# Patient Record
Sex: MALE | Race: WHITE | NOT HISPANIC OR LATINO | Employment: FULL TIME | URBAN - METROPOLITAN AREA
[De-identification: names, ages, dates, MRNs, and addresses within clinical notes are randomized per-mention and may not be internally consistent; named-entity substitution may affect disease eponyms.]

---

## 2018-08-01 PROCEDURE — 88305 TISSUE EXAM BY PATHOLOGIST: CPT | Performed by: PATHOLOGY

## 2018-08-02 ENCOUNTER — LAB REQUISITION (OUTPATIENT)
Dept: LAB | Facility: HOSPITAL | Age: 53
End: 2018-08-02
Payer: COMMERCIAL

## 2018-08-02 DIAGNOSIS — D48.9 NEOPLASM OF UNCERTAIN BEHAVIOR: ICD-10-CM

## 2020-03-04 ENCOUNTER — HOSPITAL ENCOUNTER (OUTPATIENT)
Facility: HOSPITAL | Age: 55
Setting detail: OBSERVATION
Discharge: HOME | End: 2020-03-05
Attending: EMERGENCY MEDICINE | Admitting: FAMILY MEDICINE
Payer: COMMERCIAL

## 2020-03-04 ENCOUNTER — APPOINTMENT (EMERGENCY)
Dept: RADIOLOGY | Facility: HOSPITAL | Age: 55
End: 2020-03-04
Payer: COMMERCIAL

## 2020-03-04 DIAGNOSIS — R61 DIAPHORESIS: ICD-10-CM

## 2020-03-04 DIAGNOSIS — R55 NEAR SYNCOPE: ICD-10-CM

## 2020-03-04 DIAGNOSIS — R11.10 VOMITING: ICD-10-CM

## 2020-03-04 DIAGNOSIS — R19.7 DIARRHEA: ICD-10-CM

## 2020-03-04 DIAGNOSIS — R06.00 DYSPNEA: ICD-10-CM

## 2020-03-04 DIAGNOSIS — R07.9 CHEST PAIN: Primary | ICD-10-CM

## 2020-03-04 DIAGNOSIS — R11.0 NAUSEA: ICD-10-CM

## 2020-03-04 PROBLEM — R91.1 LUNG NODULE SEEN ON IMAGING STUDY: Status: ACTIVE | Noted: 2020-03-04

## 2020-03-04 PROBLEM — I10 ESSENTIAL HYPERTENSION: Status: ACTIVE | Noted: 2020-03-04

## 2020-03-04 LAB
ALBUMIN SERPL BCP-MCNC: 3.9 G/DL (ref 3.5–5)
ALP SERPL-CCNC: 63 U/L (ref 46–116)
ALT SERPL W P-5'-P-CCNC: 44 U/L (ref 12–78)
ANION GAP SERPL CALCULATED.3IONS-SCNC: 10 MMOL/L (ref 4–13)
AST SERPL W P-5'-P-CCNC: 34 U/L (ref 5–45)
BACTERIA UR QL AUTO: NORMAL /HPF
BASOPHILS # BLD AUTO: 0.04 THOUSANDS/ΜL (ref 0–0.1)
BASOPHILS NFR BLD AUTO: 1 % (ref 0–1)
BILIRUB SERPL-MCNC: 0.6 MG/DL (ref 0.2–1)
BILIRUB UR QL STRIP: NEGATIVE
BUN SERPL-MCNC: 12 MG/DL (ref 5–25)
CALCIUM SERPL-MCNC: 9.4 MG/DL (ref 8.3–10.1)
CHLORIDE SERPL-SCNC: 98 MMOL/L (ref 100–108)
CLARITY UR: CLEAR
CO2 SERPL-SCNC: 28 MMOL/L (ref 21–32)
COLOR UR: NORMAL
CREAT SERPL-MCNC: 0.63 MG/DL (ref 0.6–1.3)
EOSINOPHIL # BLD AUTO: 0.12 THOUSAND/ΜL (ref 0–0.61)
EOSINOPHIL NFR BLD AUTO: 2 % (ref 0–6)
ERYTHROCYTE [DISTWIDTH] IN BLOOD BY AUTOMATED COUNT: 12.9 % (ref 11.6–15.1)
GFR SERPL CREATININE-BSD FRML MDRD: 113 ML/MIN/1.73SQ M
GLUCOSE SERPL-MCNC: 93 MG/DL (ref 65–140)
GLUCOSE UR STRIP-MCNC: NEGATIVE MG/DL
HCT VFR BLD AUTO: 42.9 % (ref 36.5–49.3)
HCT VFR BLD AUTO: 45.5 % (ref 36.5–49.3)
HGB BLD-MCNC: 13.9 G/DL (ref 12–17)
HGB BLD-MCNC: 14.8 G/DL (ref 12–17)
HGB UR QL STRIP.AUTO: NEGATIVE
IMM GRANULOCYTES # BLD AUTO: 0.02 THOUSAND/UL (ref 0–0.2)
IMM GRANULOCYTES NFR BLD AUTO: 0 % (ref 0–2)
KETONES UR STRIP-MCNC: NEGATIVE MG/DL
LEUKOCYTE ESTERASE UR QL STRIP: NEGATIVE
LIPASE SERPL-CCNC: 64 U/L (ref 73–393)
LYMPHOCYTES # BLD AUTO: 1.8 THOUSANDS/ΜL (ref 0.6–4.47)
LYMPHOCYTES NFR BLD AUTO: 25 % (ref 14–44)
MCH RBC QN AUTO: 29 PG (ref 26.8–34.3)
MCHC RBC AUTO-ENTMCNC: 32.5 G/DL (ref 31.4–37.4)
MCV RBC AUTO: 89 FL (ref 82–98)
MONOCYTES # BLD AUTO: 0.72 THOUSAND/ΜL (ref 0.17–1.22)
MONOCYTES NFR BLD AUTO: 10 % (ref 4–12)
NEUTROPHILS # BLD AUTO: 4.56 THOUSANDS/ΜL (ref 1.85–7.62)
NEUTS SEG NFR BLD AUTO: 62 % (ref 43–75)
NITRITE UR QL STRIP: NEGATIVE
NON-SQ EPI CELLS URNS QL MICRO: NORMAL /HPF
NRBC BLD AUTO-RTO: 0 /100 WBCS
NT-PROBNP SERPL-MCNC: 28 PG/ML
PH UR STRIP.AUTO: 6 [PH]
PLATELET # BLD AUTO: 365 THOUSANDS/UL (ref 149–390)
PMV BLD AUTO: 9.7 FL (ref 8.9–12.7)
POTASSIUM SERPL-SCNC: 3.8 MMOL/L (ref 3.5–5.3)
PROT SERPL-MCNC: 8 G/DL (ref 6.4–8.2)
PROT UR STRIP-MCNC: NEGATIVE MG/DL
RBC # BLD AUTO: 5.11 MILLION/UL (ref 3.88–5.62)
RBC #/AREA URNS AUTO: NORMAL /HPF
SODIUM SERPL-SCNC: 136 MMOL/L (ref 136–145)
SP GR UR STRIP.AUTO: 1.01 (ref 1–1.03)
TROPONIN I SERPL-MCNC: <0.02 NG/ML
UROBILINOGEN UR QL STRIP.AUTO: 0.2 E.U./DL
WBC # BLD AUTO: 7.26 THOUSAND/UL (ref 4.31–10.16)
WBC #/AREA URNS AUTO: NORMAL /HPF

## 2020-03-04 PROCEDURE — 84484 ASSAY OF TROPONIN QUANT: CPT | Performed by: PHYSICIAN ASSISTANT

## 2020-03-04 PROCEDURE — 83690 ASSAY OF LIPASE: CPT | Performed by: PHYSICIAN ASSISTANT

## 2020-03-04 PROCEDURE — 85025 COMPLETE CBC W/AUTO DIFF WBC: CPT | Performed by: PHYSICIAN ASSISTANT

## 2020-03-04 PROCEDURE — 99285 EMERGENCY DEPT VISIT HI MDM: CPT

## 2020-03-04 PROCEDURE — 84484 ASSAY OF TROPONIN QUANT: CPT | Performed by: FAMILY MEDICINE

## 2020-03-04 PROCEDURE — 81001 URINALYSIS AUTO W/SCOPE: CPT | Performed by: FAMILY MEDICINE

## 2020-03-04 PROCEDURE — 99285 EMERGENCY DEPT VISIT HI MDM: CPT | Performed by: PHYSICIAN ASSISTANT

## 2020-03-04 PROCEDURE — 99220 PR INITIAL OBSERVATION CARE/DAY 70 MINUTES: CPT | Performed by: FAMILY MEDICINE

## 2020-03-04 PROCEDURE — 74177 CT ABD & PELVIS W/CONTRAST: CPT

## 2020-03-04 PROCEDURE — 96361 HYDRATE IV INFUSION ADD-ON: CPT

## 2020-03-04 PROCEDURE — 70450 CT HEAD/BRAIN W/O DYE: CPT

## 2020-03-04 PROCEDURE — 85018 HEMOGLOBIN: CPT | Performed by: FAMILY MEDICINE

## 2020-03-04 PROCEDURE — 93005 ELECTROCARDIOGRAM TRACING: CPT

## 2020-03-04 PROCEDURE — 85014 HEMATOCRIT: CPT | Performed by: FAMILY MEDICINE

## 2020-03-04 PROCEDURE — 36415 COLL VENOUS BLD VENIPUNCTURE: CPT | Performed by: PHYSICIAN ASSISTANT

## 2020-03-04 PROCEDURE — 80053 COMPREHEN METABOLIC PANEL: CPT | Performed by: PHYSICIAN ASSISTANT

## 2020-03-04 PROCEDURE — 96374 THER/PROPH/DIAG INJ IV PUSH: CPT

## 2020-03-04 PROCEDURE — 83880 ASSAY OF NATRIURETIC PEPTIDE: CPT | Performed by: PHYSICIAN ASSISTANT

## 2020-03-04 RX ORDER — LISINOPRIL 10 MG/1
10 TABLET ORAL DAILY
Status: DISCONTINUED | OUTPATIENT
Start: 2020-03-05 | End: 2020-03-05 | Stop reason: HOSPADM

## 2020-03-04 RX ORDER — ONDANSETRON 2 MG/ML
4 INJECTION INTRAMUSCULAR; INTRAVENOUS ONCE
Status: COMPLETED | OUTPATIENT
Start: 2020-03-04 | End: 2020-03-04

## 2020-03-04 RX ORDER — SODIUM CHLORIDE 9 MG/ML
75 INJECTION, SOLUTION INTRAVENOUS CONTINUOUS
Status: DISCONTINUED | OUTPATIENT
Start: 2020-03-04 | End: 2020-03-05

## 2020-03-04 RX ORDER — PANTOPRAZOLE SODIUM 40 MG/1
40 TABLET, DELAYED RELEASE ORAL
Status: DISCONTINUED | OUTPATIENT
Start: 2020-03-04 | End: 2020-03-05 | Stop reason: HOSPADM

## 2020-03-04 RX ORDER — ASPIRIN 81 MG/1
81 TABLET ORAL DAILY
Status: DISCONTINUED | OUTPATIENT
Start: 2020-03-04 | End: 2020-03-05 | Stop reason: HOSPADM

## 2020-03-04 RX ORDER — ONDANSETRON 2 MG/ML
4 INJECTION INTRAMUSCULAR; INTRAVENOUS EVERY 6 HOURS PRN
Status: DISCONTINUED | OUTPATIENT
Start: 2020-03-04 | End: 2020-03-05 | Stop reason: HOSPADM

## 2020-03-04 RX ORDER — LISINOPRIL 10 MG/1
10 TABLET ORAL DAILY
COMMUNITY

## 2020-03-04 RX ORDER — ACETAMINOPHEN 325 MG/1
650 TABLET ORAL EVERY 6 HOURS PRN
Status: DISCONTINUED | OUTPATIENT
Start: 2020-03-04 | End: 2020-03-05 | Stop reason: HOSPADM

## 2020-03-04 RX ADMIN — ONDANSETRON 4 MG: 2 INJECTION INTRAMUSCULAR; INTRAVENOUS at 23:28

## 2020-03-04 RX ADMIN — ASPIRIN 81 MG: 81 TABLET, COATED ORAL at 18:44

## 2020-03-04 RX ADMIN — PANTOPRAZOLE SODIUM 40 MG: 40 TABLET, DELAYED RELEASE ORAL at 18:44

## 2020-03-04 RX ADMIN — ONDANSETRON 4 MG: 2 INJECTION INTRAMUSCULAR; INTRAVENOUS at 14:39

## 2020-03-04 RX ADMIN — SODIUM CHLORIDE 1000 ML: 0.9 INJECTION, SOLUTION INTRAVENOUS at 14:36

## 2020-03-04 RX ADMIN — IOHEXOL 100 ML: 350 INJECTION, SOLUTION INTRAVENOUS at 15:52

## 2020-03-04 RX ADMIN — SODIUM CHLORIDE 75 ML/HR: 0.9 INJECTION, SOLUTION INTRAVENOUS at 18:49

## 2020-03-04 NOTE — ED PROVIDER NOTES
History  Chief Complaint   Patient presents with    Nausea     Pt reports drinking unpasteurized milk on Sunday which caused him to be nauseous and have severe diarrhea that would not let up; pt reports this went on until Tuesday, and he felt better today so he went to work  At work today pt was nauseous and had a headache, he called his PCP and he told him to come here      This is a 50-year-old male patient who 4 days drank unpasteurized milk for that 24 hours he had severe vomiting and diarrhea has started to improve this morning he went to work and then had several bouts of severe diarrhea that he states the last 1 was bloody  He also has some nausea  He had several bouts shortness of breath and diaphoresis with this he denies any chest pain  He feels generalized weakness  Also complains of being dizzy not room spinning but more off balance and was he stated he felt like he was going to pass out  Patient also complains of pain over his epigastric area that does not radiate and describes it more as a burning  It is reproducible  No fever no chills  He states since the nausea he has had a generalized headache without neuro deficit  No cough congestion sore throat no urgency frequency or dysuria  Nothing makes the symptoms better or worse  Has tried nothing over-the-counter  Differential diagnosis includes not limited to nausea and vomiting secondary to spoiled no, viral syndrome, atypical ACS, pancreatitis, gastritis, cholecystitis, dizziness secondary to dehydration, intercranial issue, atypical ACS, electrolyte abnormality  Posterior CVA unlikely          Prior to Admission Medications   Prescriptions Last Dose Informant Patient Reported? Taking?   lisinopril (ZESTRIL) 10 mg tablet 3/4/2020 at Unknown time  Yes Yes   Sig: Take 10 mg by mouth daily      Facility-Administered Medications: None       History reviewed  No pertinent past medical history      Past Surgical History:   Procedure Laterality Date    HERNIA REPAIR      REPLACEMENT TOTAL KNEE BILATERAL Bilateral        History reviewed  No pertinent family history  I have reviewed and agree with the history as documented  E-Cigarette/Vaping    E-Cigarette Use Never User      E-Cigarette/Vaping Substances    Nicotine No     THC No     CBD No     Flavoring No     Other No     Unknown No      Social History     Tobacco Use    Smoking status: Former Smoker     Last attempt to quit: 3/4/2012     Years since quittin 0   Substance Use Topics    Alcohol use: Yes     Frequency: 2-4 times a month     Drinks per session: 3 or 4    Drug use: Never       Review of Systems   Constitutional: Positive for diaphoresis  Negative for fatigue and fever  HENT: Negative for congestion, ear pain, nosebleeds and sore throat  Eyes: Negative for photophobia, pain, discharge and visual disturbance  Respiratory: Positive for shortness of breath  Negative for cough, choking, chest tightness and wheezing  Cardiovascular: Negative for chest pain and palpitations  Gastrointestinal: Positive for abdominal pain, blood in stool, diarrhea, nausea and vomiting  Negative for abdominal distention and rectal pain  Endocrine: Negative for polydipsia and polyphagia  Genitourinary: Negative for dysuria, flank pain and frequency  Musculoskeletal: Negative for back pain, gait problem and joint swelling  Skin: Negative for color change and rash  Neurological: Positive for dizziness and headaches  Negative for tremors, seizures, syncope, facial asymmetry, speech difficulty, weakness, light-headedness and numbness  Psychiatric/Behavioral: Negative for behavioral problems and confusion  The patient is not nervous/anxious  All other systems reviewed and are negative  Physical Exam  Physical Exam   Constitutional: He appears well-developed and well-nourished  HENT:   Head: Normocephalic and atraumatic     Right Ear: External ear normal  Left Ear: External ear normal    Nose: Nose normal    Mouth/Throat: Oropharynx is clear and moist    Eyes: Pupils are equal, round, and reactive to light  Conjunctivae are normal    Neck: Normal range of motion  Neck supple  Cardiovascular: Normal rate and regular rhythm  Pulmonary/Chest: Effort normal and breath sounds normal    Abdominal: Soft  Bowel sounds are normal        Genitourinary: Rectal exam shows guaiac negative stool  Neurological: He is alert  Skin: Skin is warm  Psychiatric: He has a normal mood and affect  His behavior is normal    Nursing note and vitals reviewed        Vital Signs  ED Triage Vitals   Temperature Pulse Respirations Blood Pressure SpO2   03/04/20 1821 03/04/20 1342 03/04/20 1342 03/04/20 1342 03/04/20 1342   98 4 °F (36 9 °C) 81 22 143/76 97 %      Temp Source Heart Rate Source Patient Position - Orthostatic VS BP Location FiO2 (%)   03/04/20 2118 03/04/20 1342 03/04/20 1342 03/04/20 1342 --   Oral Monitor Lying Left arm       Pain Score       03/04/20 1530       No Pain           Vitals:    03/04/20 2119 03/04/20 2121 03/04/20 2323 03/05/20 0319   BP: 127/76 142/91 133/85 130/88   Pulse: 84 89 82 77   Patient Position - Orthostatic VS: Sitting - Orthostatic VS Standing - Orthostatic VS Lying Lying         Visual Acuity      ED Medications  Medications   ondansetron (ZOFRAN) injection 4 mg (4 mg Intravenous Given 3/4/20 2328)   lisinopril (ZESTRIL) tablet 10 mg (has no administration in time range)   sodium chloride 0 9 % infusion (75 mL/hr Intravenous New Bag 3/5/20 0638)   acetaminophen (TYLENOL) tablet 650 mg (650 mg Oral Given 3/5/20 0631)   aspirin (ECOTRIN LOW STRENGTH) EC tablet 81 mg (81 mg Oral Given 3/4/20 1844)   pantoprazole (PROTONIX) EC tablet 40 mg (40 mg Oral Given 3/5/20 0629)   sodium chloride 0 9 % bolus 1,000 mL (0 mL Intravenous Stopped 3/4/20 1632)   ondansetron (ZOFRAN) injection 4 mg (4 mg Intravenous Given 3/4/20 1439)   iohexol (OMNIPAQUE) 350 MG/ML injection (MULTI-DOSE) 100 mL (100 mL Intravenous Given 3/4/20 0072)       Diagnostic Studies  Results Reviewed     Procedure Component Value Units Date/Time    Comprehensive metabolic panel [[id number]]  (Abnormal) Collected:  03/04/20 1433    Lab Status:  Final result Specimen:  Blood from Arm, Right Updated:  03/04/20 1515     Sodium 136 mmol/L      Potassium 3 8 mmol/L      Chloride 98 mmol/L      CO2 28 mmol/L      ANION GAP 10 mmol/L      BUN 12 mg/dL      Creatinine 0 63 mg/dL      Glucose 93 mg/dL      Calcium 9 4 mg/dL      AST 34 U/L      ALT 44 U/L      Alkaline Phosphatase 63 U/L      Total Protein 8 0 g/dL      Albumin 3 9 g/dL      Total Bilirubin 0 60 mg/dL      eGFR 113 ml/min/1 73sq m     Narrative:       [place] guidelines for Chronic Kidney Disease (CKD):     Stage 1 with normal or high GFR (GFR > 90 mL/min/1 73 square meters)    Stage 2 Mild CKD (GFR = 60-89 mL/min/1 73 square meters)    Stage 3A Moderate CKD (GFR = 45-59 mL/min/1 73 square meters)    Stage 3B Moderate CKD (GFR = 30-44 mL/min/1 73 square meters)    Stage 4 Severe CKD (GFR = 15-29 mL/min/1 73 square meters)    Stage 5 End Stage CKD (GFR <15 mL/min/1 73 square meters)  Note: GFR calculation is accurate only with a steady state creatinine    Lipase [[id number]]  (Abnormal) Collected:  03/04/20 1433    Lab Status:  Final result Specimen:  Blood from Arm, Right Updated:  03/04/20 1515     Lipase 64 u/L     NT-BNP PRO [[id number]]  (Normal) Collected:  03/04/20 1433    Lab Status:  Final result Specimen:  Blood from Arm, Right Updated:  03/04/20 1515     NT-proBNP 28 pg/mL     Troponin I [[id number]]  (Normal) Collected:  03/04/20 1433    Lab Status:  Final result Specimen:  Blood from Arm, Right Updated:  03/04/20 1513     Troponin I <0 02 ng/mL     CBC and differential [[id number]] Collected:  03/04/20 1433    Lab Status:  Final result Specimen:  Blood from Arm, Right Updated:  03/04/20 1443     WBC 7 26 Thousand/uL      RBC 5 11 Million/uL      Hemoglobin 14 8 g/dL      Hematocrit 45 5 %      MCV 89 fL      MCH 29 0 pg      MCHC 32 5 g/dL      RDW 12 9 %      MPV 9 7 fL      Platelets 408 Thousands/uL      nRBC 0 /100 WBCs      Neutrophils Relative 62 %      Immat GRANS % 0 %      Lymphocytes Relative 25 %      Monocytes Relative 10 %      Eosinophils Relative 2 %      Basophils Relative 1 %      Neutrophils Absolute 4 56 Thousands/µL      Immature Grans Absolute 0 02 Thousand/uL      Lymphocytes Absolute 1 80 Thousands/µL      Monocytes Absolute 0 72 Thousand/µL      Eosinophils Absolute 0 12 Thousand/µL      Basophils Absolute 0 04 Thousands/µL     Stool Enteric Bacterial Panel by PCR [[id number]]     Lab Status:  No result Specimen:  Stool from Rectum     Aeromonas/Pleisiomonas Stool Culture [[id number]]     Lab Status:  No result Specimen:  Stool from Per Rectum                  CT head without contrast   Final Result by Massimo Lambert MD (03/04 1642)      No acute intracranial abnormality  Workstation performed: [id number]         CT abdomen pelvis with contrast   Final Result by Nicole Hudson MD (03/04 1625)      1  No acute abnormality in the abdomen or pelvis  2   Diffuse hepatic steatosis  3   Diverticulosis coli  4   Lung nodules measuring up to 6 mm  Based on current Fleischner Society 2017 Guidelines on incidental pulmonary nodule, followup non-contrast CT is recommended at 6-12 months from the initial examination and, if stable at that time, an additional    followup is recommended for 18-24 months from the initial examination  The study was marked in EPIC for significant notification              Workstation performed: [id number]         XR chest portable    (Results Pending)              Procedures  Procedures         ED Course  ED Course as of Mar 05 0656   Wed Mar 04, 2020   4109 Initial EKG interpreted by me 85 beats per minute normal sinus rhythm no ST elevation 1 unifocal PVC normal axis  no previous            HEART Risk Score      Most Recent Value   Heart Score Risk Calculator   History  1 Filed at: 03/04/2020 1656   ECG  0 Filed at: 03/04/2020 1656   Age  1 Filed at: 03/04/2020 1656   Risk Factors  2 Filed at: 03/04/2020 1656   Troponin  0 Filed at: 03/04/2020 1656   HEART Score  4 Filed at: 03/04/2020 1656                            MDM      Disposition  Final diagnoses:   Nausea   Vomiting   Diarrhea   Diaphoresis   Near syncope     Time reflects when diagnosis was documented in both MDM as applicable and the Disposition within this note     Time User Action Codes Description Comment    3/4/2020  5:10 PM Samantha Oscar Add [R07 9] Chest pain     3/4/2020  5:11 PM [place], [place] Add [R11 0] Nausea     3/4/2020  5:11 PM Jos Sprinkles [R11 10] Vomiting     3/4/2020  5:12 PM Jos Sprinkles [R19 7] Diarrhea     3/4/2020  5:12 PM Tino Godfrey, [address]     3/4/2020  5:12 PM Dinapoli, [address] [place] Add [R55] Near syncope       ED Disposition     ED Disposition Condition Date/Time Comment    Admit Stable Wed Mar 4, 2020  5:11 PM Case was discussed with Dr Emilee Thompson  and the patient's admission status was agreed to be Admission Status: observation status to the service of Dr Emilee Thompson   Follow-up Information    None         Current Discharge Medication List      CONTINUE these medications which have NOT CHANGED    Details   lisinopril (ZESTRIL) 10 mg tablet Take 10 mg by mouth daily           No discharge procedures on file      PDMP Review     None          ED Provider  Electronically Signed by           Marifer Acevedo PA-C  03/04/20 [place] [facility]  Tino Godfrey PA-C  03/05/20 0709

## 2020-03-04 NOTE — LETTER
[address] [facility] [address]  [place]  [place] [address]  Dept: [phone]    March 5, 2020     Patient: Marielos Schilling   YOB: 1965   Date of Visit: 3/4/2020       To Whom it May Concern:    Shira Galicia is under my professional care  He was seen in the hospital from 3/4/2020   to 03/05/20  He may return to work on 3/9/20 without limitations  If you have any questions or concerns, please don't hesitate to call           Sincerely,          Oly Hirsch PA-C

## 2020-03-04 NOTE — H&P
History and Physical - [facility]    Patient Information: Buck Light 49 y o  male MRN: [id number]  Unit/Bed#: DELFINA Encounter: [id number]  Admitting Physician: Virginia Ch DO  PCP: Augustus Jerome DO  Date of Admission:  03/04/20        Hospital Problem List:     Principal Problem:    Dyspnea  Active Problems:    Near syncope    Lung nodule seen on imaging study    Diarrhea    Essential hypertension      Assessment/Plan:    * Dyspnea  Assessment & Plan  Patient reports dyspnea, diaphoresis  Patient is also extremely stressed out at work over the last week  Will obtain chest x-ray for further evaluation  Initial troponin negative  Trend troponins  Place patient on aspirin for now  Check lipid panel, TSH  Patient denies any recent cardiac workup  Keep patient NPO after midnight for any possible cardiac evaluation  Check overnight pulse ox    Near syncope  Assessment & Plan  Patient reports lightheadedness while at work today and felt like he was going to pass out  Although creatinine is normal this could be due to hypovolemia from multiple episodes of diarrhea that he was having at home  Obtain orthostatic vitals for further evaluation  CT head was negative for acute abnormality  Gentle hydration with IV fluids overnight  Patient received a L of normal saline bolus in the ER    Diarrhea  Assessment & Plan  Patient reports epigastric pain, diarrhea and nausea after drinking unpasteurized milk  Diarrhea has resolved but still with nausea  Likely viral gastroenteritis  CT scan showed diverticulosis and diffuse hepatic steatosis but no acute abnormality  Symptomatic treatment with IV fluids, IV Zofran p r n  Lipase within normal limits  Repeat lab work in a m  Patient reports 1 episode today where he noticed some bright red blood in the toilet  This could be hemorrhoidal bleeding  Hemoglobin is stable  Repeat hemoglobin later today and again in a m   Patient can follow up with GI after discharge for further management    Essential hypertension  Assessment & Plan  Patient states that his blood pressure was in the 170s when EMS checked it  Blood pressure improved in the ER  Continue lisinopril and continue to monitor blood pressures      Lung nodule seen on imaging study  Assessment & Plan  Patient need CT chest in 6-12 months for follow-up  Patient notified regarding these findings          VTE Prophylaxis: Enoxaparin (Lovenox)  / sequential compression device   Code Status: Level 1 - Full Code    Anticipated Length of Stay:  Patient will be admitted on an Observation basis with an anticipated length of stay of  1 midnights  Justification for Hospital Stay: dyspnea, diaphoresis, near syncope    Total Time for Visit, including Counseling / Coordination of Care: 45 minutes  Greater than 50% of this total time spent on direct patient counseling and coordination of care  Chief Complaint:     Nausea (Pt reports drinking unpasteurized milk on Sunday which caused him to be nauseous and have severe diarrhea that would not let up; pt reports this went on until Tuesday, and he felt better today so he went to work  At work today pt was nauseous and had a headache, he called his PCP and he told him to come here )    of Present Illness:    Katie Roque is a 49 y o  male who presents with complaints of lightheadedness, weakness earlier today  patient states that about 4 days ago he drank unpasteurized milk and about 24 hours later developed multiple episodes of diarrhea along with nausea but no vomiting  Patient states that over the last 2 days the diarrhea has resolved but he has had multiple formed BMs  With his last BM he noticed some bright red blood in the toilet  Patient also reports shortness of breath and diaphoresis over the last few days but no chest pain  Does report epigastric pain which feels more like a burning    Denies any change in appetite, fevers, chills, recent travels or sick contact  Reports some productive cough  Patient states that while at work today he felt extremely lightheaded and felt like he was going to pass out  He does report being stressed at work over the last week  Denies any recent cardiac workup    Review of Systems:    Review of Systems   Constitutional: Negative for appetite change and chills  HENT: Negative for congestion, sore throat and trouble swallowing  Eyes: Negative for photophobia  Respiratory: Positive for cough and shortness of breath  Negative for chest tightness  Cardiovascular: Positive for palpitations  Negative for chest pain and leg swelling  Gastrointestinal: Positive for abdominal pain, anal bleeding, diarrhea and nausea  Negative for vomiting  Genitourinary: Positive for frequency  Negative for dysuria and hematuria  Musculoskeletal: Positive for back pain (chronic)  Skin: Negative for wound  Neurological: Positive for weakness, light-headedness and headaches  Negative for syncope  Psychiatric/Behavioral: Negative for agitation  Past Medical and Surgical History:     History reviewed  No pertinent past medical history  Past Surgical History:   Procedure Laterality Date    HERNIA REPAIR      REPLACEMENT TOTAL KNEE BILATERAL Bilateral        Meds/Allergies:    PTA meds:   Prior to Admission Medications   Prescriptions Last Dose Informant Patient Reported? Taking?   lisinopril (ZESTRIL) 10 mg tablet 3/4/2020 at Unknown time  Yes Yes   Sig: Take 10 mg by mouth daily      Facility-Administered Medications: None       Allergies:    Allergies   Allergen Reactions    Lunesta [Eszopiclone] Delirium     History:     Marital Status: /Civil Union     Substance Use History:   Social History     Substance and Sexual Activity   Alcohol Use Yes    Frequency: 2-4 times a month    Drinks per session: 3 or 4     Social History     Tobacco Use   Smoking Status Former Smoker    Last attempt to quit: 3/4/2012    Years since quittin 0     Social History     Substance and Sexual Activity   Drug Use Never       Family History:    History reviewed  No pertinent family history  Physical Exam:     Vitals:   Blood Pressure: 144/86 (20 1632)  Pulse: 80 (20 1632)  Respirations: 17 (20 1632)  SpO2: 97 % (20 1632)    Physical Exam   Constitutional: He is oriented to person, place, and time  He appears well-developed and well-nourished  No distress  HENT:   Head: Normocephalic and atraumatic  Eyes: EOM are normal  Right eye exhibits no discharge  Left eye exhibits no discharge  No scleral icterus  Cardiovascular: Normal rate and regular rhythm  Pulmonary/Chest: Effort normal and breath sounds normal  No respiratory distress  He has no wheezes  He has no rales  Abdominal: Soft  Bowel sounds are normal  He exhibits no distension  There is no tenderness  Musculoskeletal: He exhibits no edema  Neurological: He is alert and oriented to person, place, and time  No cranial nerve deficit  Skin: He is not diaphoretic  Psychiatric: His mood appears anxious  Lab Results: I have personally reviewed pertinent reports  Results from last 7 days   Lab Units 20  1433   WBC Thousand/uL 7 26   HEMOGLOBIN g/dL 14 8   HEMATOCRIT % 45 5   PLATELETS Thousands/uL 365   NEUTROS PCT % 62   LYMPHS PCT % 25   MONOS PCT % 10   EOS PCT % 2     Results from last 7 days   Lab Units 20  1433   POTASSIUM mmol/L 3 8   CHLORIDE mmol/L 98*   CO2 mmol/L 28   BUN mg/dL 12   CREATININE mg/dL 0 63   CALCIUM mg/dL 9 4   ALK PHOS U/L 63   ALT U/L 44   AST U/L 34           Imaging: I have personally reviewed pertinent reports  Ct Head Without Contrast    Result Date: 3/4/2020  Narrative: CT BRAIN - WITHOUT CONTRAST INDICATION:   Dizzy  COMPARISON:  None  TECHNIQUE:  CT examination of the brain was performed    In addition to axial images, coronal 2D reformatted images were created and submitted for interpretation  Radiation dose length product (DLP) for this visit:  946 63 mGy-cm   This examination, like all CT scans performed in the [facility], was performed utilizing techniques to minimize radiation dose exposure, including the use of iterative  reconstruction and automated exposure control  IMAGE QUALITY:  Diagnostic  FINDINGS: PARENCHYMA:  No intracranial mass, mass effect or midline shift  No CT signs of acute infarction  No acute parenchymal hemorrhage  VENTRICLES AND EXTRA-AXIAL SPACES:  Normal for the patient's age  VISUALIZED ORBITS AND PARANASAL SINUSES:  Unremarkable  CALVARIUM AND EXTRACRANIAL SOFT TISSUES:  Normal      Impression: No acute intracranial abnormality  Workstation performed: [id number]     Ct Abdomen Pelvis With Contrast    Result Date: 3/4/2020  Narrative: CT ABDOMEN AND PELVIS WITH IV CONTRAST INDICATION:   Abdominal pain  Nausea  There is a 6 mm left lower lobe nodule on series 2, image 11  COMPARISON:  None  TECHNIQUE:  CT examination of the abdomen and pelvis was performed  Axial, sagittal, and coronal 2D reformatted images were created from the source data and submitted for interpretation  Radiation dose length product (DLP) for this visit:  1778 19 mGy-cm   This examination, like all CT scans performed in the [facility], was performed utilizing techniques to minimize radiation dose exposure, including the use of iterative reconstruction and automated exposure control  IV Contrast:  100 mL of iohexol (OMNIPAQUE) Enteric Contrast:  Enteric contrast was not administered  FINDINGS: ABDOMEN LOWER CHEST:  There is a 5 mm right lower lobe nodule on series 2, image 12  LIVER/BILIARY TREE:  Liver is diffusely decreased in density consistent with fatty change  No CT evidence of suspicious hepatic mass  Normal hepatic contours  No biliary dilatation  GALLBLADDER:  No calcified gallstones  No pericholecystic inflammatory change   SPLEEN: Unremarkable  PANCREAS:  Unremarkable  ADRENAL GLANDS:  Unremarkable  KIDNEYS/URETERS:  Unremarkable  No hydronephrosis  STOMACH AND BOWEL:  There is colonic diverticulosis without evidence of acute diverticulitis  APPENDIX:  No findings to suggest appendicitis  ABDOMINOPELVIC CAVITY:  No ascites or free intraperitoneal air  No lymphadenopathy  VESSELS:  Unremarkable for patient's age  PELVIS REPRODUCTIVE ORGANS:  Unremarkable for patient's age  URINARY BLADDER:  Unremarkable  ABDOMINAL WALL/INGUINAL REGIONS:  Unremarkable  OSSEOUS STRUCTURES:  No acute fracture or destructive osseous lesion  Impression: 1  No acute abnormality in the abdomen or pelvis  2   Diffuse hepatic steatosis  3   Diverticulosis coli  4   Lung nodules measuring up to 6 mm  Based on current Fleischner Society 2017 Guidelines on incidental pulmonary nodule, followup non-contrast CT is recommended at 6-12 months from the initial examination and, if stable at that time, an additional followup is recommended for 18-24 months from the initial examination  The study was marked in EPIC for significant notification  Workstation performed: [id number]       CT head without contrast   Final Result      No acute intracranial abnormality  Workstation performed: [id number]         CT abdomen pelvis with contrast   Final Result      1  No acute abnormality in the abdomen or pelvis  2   Diffuse hepatic steatosis  3   Diverticulosis coli  4   Lung nodules measuring up to 6 mm  Based on current Fleischner Society 2017 Guidelines on incidental pulmonary nodule, followup non-contrast CT is recommended at 6-12 months from the initial examination and, if stable at that time, an additional    followup is recommended for 18-24 months from the initial examination  The study was marked in EPIC for significant notification              Workstation performed: [id number]             EKG, Pathology, and Other Studies Reviewed on Admission:   · EKG shows sinus rhythm with no acute ST elevations    Allscripts/EPIC Records Reviewed: Yes     ** Please Note: "This note has been constructed using a voice recognition system  Therefore there may be syntax, spelling, and/or grammatical errors   Please call if you have any questions  "**

## 2020-03-04 NOTE — ASSESSMENT & PLAN NOTE
Patient reports dyspnea, diaphoresis  Patient is also extremely stressed out at work over the last week  Will obtain chest x-ray for further evaluation  Initial troponin negative  Trend troponins  Place patient on aspirin for now  Check lipid panel, TSH  Patient denies any recent cardiac workup    Keep patient NPO after midnight for any possible cardiac evaluation  Check overnight pulse ox

## 2020-03-04 NOTE — ASSESSMENT & PLAN NOTE
Patient reports epigastric pain, diarrhea and nausea after drinking unpasteurized milk  Diarrhea has resolved but still with nausea  Likely viral gastroenteritis  CT scan showed diverticulosis and diffuse hepatic steatosis but no acute abnormality  Symptomatic treatment with IV fluids, IV Zofran p r n  Lipase within normal limits  Repeat lab work in a m  Patient reports 1 episode today where he noticed some bright red blood in the toilet  This could be hemorrhoidal bleeding  Hemoglobin is stable  Repeat hemoglobin later today and again in a m   Patient can follow up with GI after discharge for further management

## 2020-03-04 NOTE — ASSESSMENT & PLAN NOTE
Patient reports lightheadedness while at work today and felt like he was going to pass out  Although creatinine is normal this could be due to hypovolemia from multiple episodes of diarrhea that he was having at home  Obtain orthostatic vitals for further evaluation  CT head was negative for acute abnormality  Gentle hydration with IV fluids overnight    Patient received a L of normal saline bolus in the ER

## 2020-03-04 NOTE — ED NOTES
Pt provided with sandwich for transport upstairs for dinner since he is staying        Coreen Bedoya RN  03/04/20 8215

## 2020-03-04 NOTE — ASSESSMENT & PLAN NOTE
Patient states that his blood pressure was in the 170s when EMS checked it  Blood pressure improved in the ER  Continue lisinopril and continue to monitor blood pressures

## 2020-03-05 ENCOUNTER — APPOINTMENT (OUTPATIENT)
Dept: NON INVASIVE DIAGNOSTICS | Facility: HOSPITAL | Age: 55
End: 2020-03-05
Payer: COMMERCIAL

## 2020-03-05 ENCOUNTER — APPOINTMENT (OUTPATIENT)
Dept: RADIOLOGY | Facility: HOSPITAL | Age: 55
End: 2020-03-05
Payer: COMMERCIAL

## 2020-03-05 VITALS
DIASTOLIC BLOOD PRESSURE: 83 MMHG | TEMPERATURE: 100.7 F | HEART RATE: 109 BPM | SYSTOLIC BLOOD PRESSURE: 127 MMHG | HEIGHT: 72 IN | BODY MASS INDEX: 42.66 KG/M2 | WEIGHT: 315 LBS | RESPIRATION RATE: 20 BRPM | OXYGEN SATURATION: 95 %

## 2020-03-05 LAB
ALBUMIN SERPL BCP-MCNC: 3.2 G/DL (ref 3.5–5)
ALP SERPL-CCNC: 52 U/L (ref 46–116)
ALT SERPL W P-5'-P-CCNC: 39 U/L (ref 12–78)
ANION GAP SERPL CALCULATED.3IONS-SCNC: 4 MMOL/L (ref 4–13)
AST SERPL W P-5'-P-CCNC: 31 U/L (ref 5–45)
ATRIAL RATE: 85 BPM
ATRIAL RATE: 87 BPM
BILIRUB SERPL-MCNC: 0.5 MG/DL (ref 0.2–1)
BUN SERPL-MCNC: 11 MG/DL (ref 5–25)
CALCIUM SERPL-MCNC: 8.6 MG/DL (ref 8.3–10.1)
CHLORIDE SERPL-SCNC: 103 MMOL/L (ref 100–108)
CHOLEST SERPL-MCNC: 96 MG/DL (ref 50–200)
CO2 SERPL-SCNC: 31 MMOL/L (ref 21–32)
CREAT SERPL-MCNC: 0.65 MG/DL (ref 0.6–1.3)
ERYTHROCYTE [DISTWIDTH] IN BLOOD BY AUTOMATED COUNT: 13 % (ref 11.6–15.1)
EST. AVERAGE GLUCOSE BLD GHB EST-MCNC: 117 MG/DL
GFR SERPL CREATININE-BSD FRML MDRD: 111 ML/MIN/1.73SQ M
GLUCOSE P FAST SERPL-MCNC: 111 MG/DL (ref 65–99)
GLUCOSE SERPL-MCNC: 111 MG/DL (ref 65–140)
HBA1C MFR BLD: 5.7 %
HCT VFR BLD AUTO: 43 % (ref 36.5–49.3)
HDLC SERPL-MCNC: 46 MG/DL
HGB BLD-MCNC: 13.7 G/DL (ref 12–17)
LDLC SERPL CALC-MCNC: 44 MG/DL (ref 0–100)
MAGNESIUM SERPL-MCNC: 2.1 MG/DL (ref 1.6–2.6)
MCH RBC QN AUTO: 29.1 PG (ref 26.8–34.3)
MCHC RBC AUTO-ENTMCNC: 31.9 G/DL (ref 31.4–37.4)
MCV RBC AUTO: 92 FL (ref 82–98)
NONHDLC SERPL-MCNC: 50 MG/DL
P AXIS: 36 DEGREES
P AXIS: 6 DEGREES
PLATELET # BLD AUTO: 314 THOUSANDS/UL (ref 149–390)
PMV BLD AUTO: 9.4 FL (ref 8.9–12.7)
POTASSIUM SERPL-SCNC: 4.3 MMOL/L (ref 3.5–5.3)
PR INTERVAL: 170 MS
PR INTERVAL: 174 MS
PROT SERPL-MCNC: 6.7 G/DL (ref 6.4–8.2)
QRS AXIS: 11 DEGREES
QRS AXIS: 14 DEGREES
QRSD INTERVAL: 94 MS
QRSD INTERVAL: 96 MS
QT INTERVAL: 352 MS
QT INTERVAL: 356 MS
QTC INTERVAL: 423 MS
QTC INTERVAL: 423 MS
RBC # BLD AUTO: 4.7 MILLION/UL (ref 3.88–5.62)
SODIUM SERPL-SCNC: 138 MMOL/L (ref 136–145)
T WAVE AXIS: 41 DEGREES
T WAVE AXIS: 57 DEGREES
TRIGL SERPL-MCNC: 32 MG/DL
TSH SERPL DL<=0.05 MIU/L-ACNC: 3.13 UIU/ML (ref 0.36–3.74)
VENTRICULAR RATE: 85 BPM
VENTRICULAR RATE: 87 BPM
WBC # BLD AUTO: 5.88 THOUSAND/UL (ref 4.31–10.16)

## 2020-03-05 PROCEDURE — 93010 ELECTROCARDIOGRAM REPORT: CPT | Performed by: INTERNAL MEDICINE

## 2020-03-05 PROCEDURE — 80061 LIPID PANEL: CPT | Performed by: FAMILY MEDICINE

## 2020-03-05 PROCEDURE — 93018 CV STRESS TEST I&R ONLY: CPT | Performed by: INTERNAL MEDICINE

## 2020-03-05 PROCEDURE — 93016 CV STRESS TEST SUPVJ ONLY: CPT | Performed by: INTERNAL MEDICINE

## 2020-03-05 PROCEDURE — 93005 ELECTROCARDIOGRAM TRACING: CPT

## 2020-03-05 PROCEDURE — 99244 OFF/OP CNSLTJ NEW/EST MOD 40: CPT | Performed by: INTERNAL MEDICINE

## 2020-03-05 PROCEDURE — 71045 X-RAY EXAM CHEST 1 VIEW: CPT

## 2020-03-05 PROCEDURE — 99217 PR OBSERVATION CARE DISCHARGE MANAGEMENT: CPT | Performed by: PHYSICIAN ASSISTANT

## 2020-03-05 PROCEDURE — 85027 COMPLETE CBC AUTOMATED: CPT | Performed by: FAMILY MEDICINE

## 2020-03-05 PROCEDURE — 84443 ASSAY THYROID STIM HORMONE: CPT | Performed by: FAMILY MEDICINE

## 2020-03-05 PROCEDURE — 83036 HEMOGLOBIN GLYCOSYLATED A1C: CPT | Performed by: PHYSICIAN ASSISTANT

## 2020-03-05 PROCEDURE — 80053 COMPREHEN METABOLIC PANEL: CPT | Performed by: FAMILY MEDICINE

## 2020-03-05 PROCEDURE — 93017 CV STRESS TEST TRACING ONLY: CPT

## 2020-03-05 PROCEDURE — 83735 ASSAY OF MAGNESIUM: CPT | Performed by: FAMILY MEDICINE

## 2020-03-05 RX ORDER — MAGNESIUM SULFATE 1 G/100ML
1 INJECTION INTRAVENOUS ONCE
Status: COMPLETED | OUTPATIENT
Start: 2020-03-05 | End: 2020-03-05

## 2020-03-05 RX ORDER — CYCLOBENZAPRINE HCL 5 MG
5 TABLET ORAL ONCE
Status: DISCONTINUED | OUTPATIENT
Start: 2020-03-05 | End: 2020-03-05

## 2020-03-05 RX ADMIN — ASPIRIN 81 MG: 81 TABLET, COATED ORAL at 10:17

## 2020-03-05 RX ADMIN — MAGNESIUM SULFATE HEPTAHYDRATE 1 G: 1 INJECTION, SOLUTION INTRAVENOUS at 10:18

## 2020-03-05 RX ADMIN — LISINOPRIL 10 MG: 10 TABLET ORAL at 10:17

## 2020-03-05 RX ADMIN — PANTOPRAZOLE SODIUM 40 MG: 40 TABLET, DELAYED RELEASE ORAL at 06:29

## 2020-03-05 RX ADMIN — ACETAMINOPHEN 650 MG: 325 TABLET, FILM COATED ORAL at 06:31

## 2020-03-05 RX ADMIN — SODIUM CHLORIDE 75 ML/HR: 0.9 INJECTION, SOLUTION INTRAVENOUS at 06:38

## 2020-03-05 NOTE — PLAN OF CARE
Problem: PAIN - ADULT  Goal: Verbalizes/displays adequate comfort level or baseline comfort level  Description  Interventions:  - Encourage patient to monitor pain and request assistance  - Assess pain using appropriate pain scale - Numeric pain scale  - Administer analgesics based on type and severity of pain and evaluate response  - Implement non-pharmacological measures as appropriate and evaluate response  - Consider cultural and social influences on pain and pain management  - Notify physician/advanced practitioner if interventions unsuccessful or patient reports new pain   Outcome: Progressing     Problem: RESPIRATORY - ADULT  Goal: Achieves optimal ventilation and oxygenation  Description  INTERVENTIONS:  - Assess for changes in respiratory status  - Assess for changes in mentation and behavior  - Position to facilitate oxygenation and minimize respiratory effort  - Oxygen administered by appropriate delivery if ordered  - Encourage broncho-pulmonary hygiene including cough, deep breathe, Incentive Spirometry  - Assess and instruct to report SOB or any respiratory difficulty  - Respiratory Therapy support as indicated   Outcome: Progressing

## 2020-03-05 NOTE — INCIDENTAL FINDINGS
The following findings require follow up:  Radiographic finding   Finding: lung nodule   Follow up required: CT chest    Follow up should be done within 6-12 month(s)    Please notify the following clinician to assist with the follow up:   Dr Juan Moran

## 2020-03-05 NOTE — PROGRESS NOTES
Patient not to be desaturating down to the 70's while asleep  While apneic episodes were not observed, oxygen saturation hung around 70's-80's for some time  Respiratory therapist made aware  Patient placed on 2lpm per NC at this time

## 2020-03-05 NOTE — NURSING NOTE
Patient was provided with discharge instructions  Patient's IV was removed  Patient is being discharged in stable condition

## 2020-03-05 NOTE — PLAN OF CARE
Problem: PAIN - ADULT  Goal: Verbalizes/displays adequate comfort level or baseline comfort level  Description  Interventions:  - Encourage patient to monitor pain and request assistance  - Assess pain using appropriate pain scale - Numeric pain scale  - Administer analgesics based on type and severity of pain and evaluate response  - Implement non-pharmacological measures as appropriate and evaluate response  - Consider cultural and social influences on pain and pain management  - Notify physician/advanced practitioner if interventions unsuccessful or patient reports new pain   3/5/2020 1428 by Alexis Carey RN  Outcome: Completed  3/5/2020 1009 by Alexis Carey RN  Outcome: Progressing     Problem: RESPIRATORY - ADULT  Goal: Achieves optimal ventilation and oxygenation  Description  INTERVENTIONS:  - Assess for changes in respiratory status  - Assess for changes in mentation and behavior  - Position to facilitate oxygenation and minimize respiratory effort  - Oxygen administered by appropriate delivery if ordered  - Encourage broncho-pulmonary hygiene including cough, deep breathe, Incentive Spirometry  - Assess and instruct to report SOB or any respiratory difficulty  - Respiratory Therapy support as indicated   3/5/2020 1428 by Alexis Carey RN  Outcome: Completed  3/5/2020 1009 by Alexis Carey RN  Outcome: Progressing

## 2020-03-05 NOTE — DISCHARGE INSTR - AVS FIRST PAGE
Dear Nick Archibald,     It was our pleasure to care for you here at [facility]  It is our hope that we were always able to exceed the expected standards for your care during your stay  You were hospitalized due to shortness of breath and dizziness  You were cared for on the 2nd floor by Mali Patel PA-C under the service of Nat Marks DO with the [facility] who covers for your primary care physician (PCP), Olimpia Hare DO, while you were hospitalized  If you have any questions or concerns related to this hospitalization, you may contact us at [phone]  For follow up as well as any medication refills, we recommend that you follow up with your primary care physician  A registered nurse will reach out to you by phone within a few days after your discharge to answer any additional questions that you may have after going home  However, at this time we provide for you here, the most important instructions / recommendations at discharge:     · Notable Medication Adjustments -   · No changes  · Testing Required after Discharge -   · You need an echocardiogram  I have ordered one for you  You should follow up with your family doctor about this to review it when it is completed  · CT CHEST 6-12 months given you have a lung nodule  You need to make sure it doesn't increase in size  · Important follow up information -   · Follow up with family doctor  · Other Instructions -   · Your blood work showed you are prediabetic  You need to reduce your carb intake and focus on a healthy diet  · Please review this entire after visit summary as additional general instructions including medication list, appointments, activity, diet, any pertinent wound care, and other additional recommendations from your care team that may be provided for you        Sincerely,   Mali Patel PA-C

## 2020-03-05 NOTE — PROGRESS NOTES
Patient requested to terminate overnight pulse oximetry study around 2:30 am  He complained that it was very uncomfortable and that he wanted it off  Explained to patient that the study was necessary specially that it was significant for him with hypoxic episodes observed while he was sleeping  Repiratory therapist was informed of patient's request to discontinue testing

## 2020-03-05 NOTE — DISCHARGE SUMMARY
Discharge Summary - [facility]    Patient Information: Keya Nixon 49 y o  male MRN: [id number]  Unit/Bed#: [address] [place] [address] Encounter: [id number]    Discharging Physician / Practitioner: Romaine Dominguez PA-C  PCP: Jeimy Teran DO  Admission Date:   Admission Orders (From admission, onward)     Ordered        03/04/20 1713  Place in Observation  Once                   Discharge Date: 03/05/20    Reason for Admission: dyspnea    Discharge Diagnoses:     Principal Problem:    Dyspnea  Active Problems:    Lung nodule seen on imaging study    Diarrhea    Near syncope    Essential hypertension  Resolved Problems:    * No resolved hospital problems  *      Consultations During Hospital Stay:  · Cardiology    Procedures Performed:     · Stress EKG:  Formal report is pending however preliminary report and my discussion with Cardiology shows no evidence of ischemic changes  · CT head: No acute intracranial abnormality  · CT A/P: No acute abnormality in the abdomen or pelvis  Diffuse hepatic steatosis  Diverticulosis coli  Lung nodules measuring up to 6 mm  · CXR:  Formal report pending  Significant Findings / Test Results:     · Exertional shortness of breath  · Near syncope, likely related to dehydration and diarrhea  · Essential hypertension  · Obesity    Incidental Findings:   · Lung nodule - CT chest noncontrast 6-12 months  · Pre diabetes with A1c 5 7    Test Results Pending at Discharge (will require follow up): · Formal report of stress EKG  · Formal report of chest x-ray     Outpatient Tests Requested:  · Echocardiogram    Complications:  none    Hospital Course:     Keya Nixon is a 49 y o  male patient who originally presented to the hospital on 3/4/2020 due to dyspnea and near syncope  Patient has a past medical history of hypertension and obesity  He presented to the emergency department after having several days of diarrhea as an outpatient    He reports that he drank unpasteurized milk on Sunday and then he felt nauseous and had severe diarrhea  He went to work the day of admission given he started to feel better however developed nausea, headache and called his PCP who directed him to come to the emergency department for further evaluation  He reports that 1 time when he whiped he saw red blood in the toilet  He has not had any further episodes of diarrhea or bloody BMs  He reports that he has had increased stressors as an outpatient  He felt extremely lightheaded and then he was going to pass out when he was at work and therefore came to the ER  He was subsequently admitted and had negative troponins  He was noted to be seen in consultation by cardiology and underwent exercise EKG stress test   This is not yet in the system however I discussed with Cardiology and there was no evidence of any ischemic changes  He was recommended to have an outpatient echocardiogram which has been ordered and for which I will follow up on  I suspect his dizziness was related to recent diarrhea and dehydration  He felt better with use of IV hydration  He had negative orthostatic vital signs  He needs to lose weight and exercise as well  Condition at Discharge: stable     Discharge Day Visit / Exam:     Subjective:  Feels okay- had headache earlier  Vitals: Blood Pressure: 137/88 (03/05/20 1004)  Pulse: 79 (03/05/20 1004)  Temperature: 97 5 °F (36 4 °C) (03/05/20 1004)  Temp Source: Oral (03/05/20 1004)  Respirations: 19 (03/05/20 1004)  Height: 6' (182 9 cm) (03/04/20 2138)  Weight - Scale: (!) 145 kg (320 lb) (03/04/20 2138)  SpO2: 96 % (03/05/20 1004)    Exam:   Physical Exam   Constitutional: No distress  HENT:   Head: Normocephalic and atraumatic  Eyes: No scleral icterus  Cardiovascular: Normal rate, regular rhythm, normal heart sounds and intact distal pulses  No murmur heard  Pulmonary/Chest: Effort normal and breath sounds normal  No accessory muscle usage   No respiratory distress  He has no wheezes  He has no rales  He exhibits no tenderness  Abdominal: Soft  Bowel sounds are normal  He exhibits no distension  There is no tenderness  There is no rigidity, no rebound and no guarding  obese   Musculoskeletal: He exhibits no edema  Neurological: He is alert  Skin: Skin is warm and dry  No rash noted  He is not diaphoretic  No erythema  Psychiatric: He has a normal mood and affect  His behavior is normal    Nursing note and vitals reviewed  Discussion with Family: patient    Discharge instructions/Information to patient and family:   See after visit summary for information provided to patient and family  Provisions for Follow-Up Care:  See after visit summary for information related to follow-up care and any pertinent home health orders  Disposition:     Home    For Discharges to [address] [facility]:   · Not Applicable to this Patient - Not Applicable to this Patient    Planned Readmission: no     Discharge Statement:  I spent 40 minutes discharging the patient  This time was spent on the day of discharge  I had direct contact with the patient on the day of discharge  Greater than 50% of the total time was spent examining patient, answering all patient questions, arranging and discussing plan of care with patient as well as directly providing post-discharge instructions  Additional time then spent on discharge activities  Discharge Medications:  See after visit summary for reconciled discharge medications provided to patient and family        ** Please Note: This note has been constructed using a voice recognition system **

## 2020-03-05 NOTE — PLAN OF CARE
Problem: PAIN - ADULT  Goal: Verbalizes/displays adequate comfort level or baseline comfort level  Description  Interventions:  - Encourage patient to monitor pain and request assistance  - Assess pain using appropriate pain scale - Numeric pain scale  - Administer analgesics based on type and severity of pain and evaluate response  - Implement non-pharmacological measures as appropriate and evaluate response  - Consider cultural and social influences on pain and pain management  - Notify physician/advanced practitioner if interventions unsuccessful or patient reports new pain   Note:   Patient with mild upper abdominal discomfort  Headache this morning which has been going on recently, even PTA  Problem: RESPIRATORY - ADULT  Goal: Achieves optimal ventilation and oxygenation  Description  INTERVENTIONS:  - Assess for changes in respiratory status  - Assess for changes in mentation and behavior  - Position to facilitate oxygenation and minimize respiratory effort  - Oxygen administered by appropriate delivery if ordered  - Encourage broncho-pulmonary hygiene including cough, deep breathe, Incentive Spirometry  - Assess and instruct to report SOB or any respiratory difficulty  - Respiratory Therapy support as indicated   Note:   Patient had overnight pulse oximetry study terminated early last night because of discomfort  Patient noted to have hypoxic episodes while asleep  O2 at 2lpm/NC applied

## 2020-03-06 LAB
CHEST PAIN STATEMENT: NORMAL
MAX DIASTOLIC BP: 96 MMHG
MAX HEART RATE: 148 BPM
MAX PREDICTED HEART RATE: 167 BPM
MAX. SYSTOLIC BP: 182 MMHG
PROTOCOL NAME: NORMAL
TARGET HR FORMULA: NORMAL
TEST INDICATION: NORMAL
TIME IN EXERCISE PHASE: NORMAL

## 2024-02-29 ENCOUNTER — OFFICE VISIT (OUTPATIENT)
Dept: URGENT CARE | Facility: MEDICAL CENTER | Age: 59
End: 2024-02-29
Payer: COMMERCIAL

## 2024-02-29 VITALS
OXYGEN SATURATION: 96 % | HEART RATE: 87 BPM | BODY MASS INDEX: 46.11 KG/M2 | DIASTOLIC BLOOD PRESSURE: 86 MMHG | TEMPERATURE: 99 F | RESPIRATION RATE: 18 BRPM | WEIGHT: 315 LBS | SYSTOLIC BLOOD PRESSURE: 144 MMHG

## 2024-02-29 DIAGNOSIS — M54.31 SCIATICA OF RIGHT SIDE: Primary | ICD-10-CM

## 2024-02-29 PROCEDURE — 99213 OFFICE O/P EST LOW 20 MIN: CPT | Performed by: PHYSICIAN ASSISTANT

## 2024-02-29 RX ORDER — METHOCARBAMOL 500 MG/1
500 TABLET, FILM COATED ORAL 3 TIMES DAILY
Qty: 15 TABLET | Refills: 0 | Status: SHIPPED | OUTPATIENT
Start: 2024-02-29 | End: 2024-03-05

## 2024-02-29 RX ORDER — PREDNISONE 20 MG/1
20 TABLET ORAL 2 TIMES DAILY WITH MEALS
Qty: 10 TABLET | Refills: 0 | Status: SHIPPED | OUTPATIENT
Start: 2024-02-29 | End: 2024-03-05

## 2024-02-29 NOTE — PATIENT INSTRUCTIONS
Take Prednisone 20mg twice daily x 5 days  Take Robaxin 500mg up to 3x daily as needed for spasm  Motrin as needed for pain  PT consult- referral provided.

## 2024-02-29 NOTE — PROGRESS NOTES
[facility]        NAME: Agustin Eastman is a 58 y.o. male  : 1965    MRN: [id number]  DATE: 2024  TIME: 10:18 AM    Assessment and Plan   Sciatica of right side [M54.31]  1. Sciatica of right side  predniSONE 20 mg tablet    methocarbamol (ROBAXIN) 500 mg tablet    Ambulatory Referral to Physical Therapy            Patient Instructions     Take Prednisone 20mg twice daily x 5 days  Take Robaxin 500mg up to 3x daily as needed for spasm  Motrin as needed for pain  PT consult- referral provided.       Chief Complaint     Chief Complaint   Patient presents with    Back Pain     Lower back pain 3 days constant pain, unable to get out of bed. Denies trauma, injury or fall.          History of Present Illness       Agustin a 58-year-old male who presents with a 2-day history of right-sided low back pain.  Patient denies any fall, trauma or inciting event.  He reports pain started in his mid back region to the right side with radiation down to his right posterior thigh and lateral knee.  Patient reports his pain is 8 out of 10.  He reports no numbness, tingling or lower leg weakness.  Patient does have a prior history of lower back pain.    Back Pain  Pertinent negatives include no numbness or weakness.       Review of Systems   Review of Systems   Constitutional: Negative.    Gastrointestinal: Negative.    Musculoskeletal:  Positive for back pain.   Neurological:  Negative for weakness and numbness.         Current Medications       Current Outpatient Medications:     methocarbamol (ROBAXIN) 500 mg tablet, Take 1 tablet (500 mg total) by mouth 3 (three) times a day for 5 days, Disp: 15 tablet, Rfl: 0    predniSONE 20 mg tablet, Take 1 tablet (20 mg total) by mouth 2 (two) times a day with meals for 5 days, Disp: 10 tablet, Rfl: 0    lisinopril (ZESTRIL) 10 mg tablet, Take 10 mg by mouth daily (Patient not taking: Reported on 2024), Disp: , Rfl:     Current Allergies     Allergies as of  02/29/2024 - Reviewed 03/05/2020   Allergen Reaction Noted    Lunesta [eszopiclone] Delirium 03/04/2020            The following portions of the patient's history were reviewed and updated as appropriate: allergies, current medications, past family history, past medical history, past social history, past surgical history and problem list.     History reviewed. No pertinent past medical history.    Past Surgical History:   Procedure Laterality Date    HERNIA REPAIR      REPLACEMENT TOTAL KNEE BILATERAL Bilateral        History reviewed. No pertinent family history.      Medications have been verified.        Objective   /86   Pulse 87   Temp 99 °F (37.2 °C)   Resp 18   Wt (!) 154 kg (340 lb)   SpO2 96%   BMI 46.11 kg/m²   No LMP for male patient.       Physical Exam     Physical Exam  Constitutional:       General: He is not in acute distress.     Appearance: Normal appearance. He is not ill-appearing.   Cardiovascular:      Rate and Rhythm: Normal rate and regular rhythm.      Heart sounds: Normal heart sounds, S1 normal and S2 normal. No murmur heard.  Pulmonary:      Effort: Pulmonary effort is normal.      Breath sounds: Normal breath sounds and air entry.   Musculoskeletal:      Lumbar back: Spasms and tenderness present. Positive right straight leg raise test.   Neurological:      Mental Status: He is alert.      Sensory: Sensation is intact.      Motor: Motor function is intact.      Deep Tendon Reflexes:      Reflex Scores:       Patellar reflexes are 2+ on the right side and 2+ on the left side.       Achilles reflexes are 2+ on the right side and 2+ on the left side.

## 2025-06-25 ENCOUNTER — TELEPHONE (OUTPATIENT)
Dept: DERMATOLOGY | Facility: CLINIC | Age: 60
End: 2025-06-25

## 2025-06-27 ENCOUNTER — OFFICE VISIT (OUTPATIENT)
Dept: DERMATOLOGY | Facility: CLINIC | Age: 60
End: 2025-06-27
Payer: COMMERCIAL

## 2025-06-27 VITALS — TEMPERATURE: 97 F | WEIGHT: 315 LBS | HEIGHT: 72 IN | BODY MASS INDEX: 42.66 KG/M2

## 2025-06-27 DIAGNOSIS — D22.9 MULTIPLE MELANOCYTIC NEVI: ICD-10-CM

## 2025-06-27 DIAGNOSIS — L81.4 LENTIGO: ICD-10-CM

## 2025-06-27 DIAGNOSIS — D18.01 CHERRY ANGIOMA: ICD-10-CM

## 2025-06-27 DIAGNOSIS — L57.0 KERATOSIS, ACTINIC: ICD-10-CM

## 2025-06-27 DIAGNOSIS — B36.0 TINEA VERSICOLOR: Primary | ICD-10-CM

## 2025-06-27 PROCEDURE — 99204 OFFICE O/P NEW MOD 45 MIN: CPT | Performed by: NURSE PRACTITIONER

## 2025-06-27 PROCEDURE — 17000 DESTRUCT PREMALG LESION: CPT | Performed by: NURSE PRACTITIONER

## 2025-06-27 RX ORDER — KETOCONAZOLE 20 MG/G
CREAM TOPICAL DAILY
Qty: 60 G | Refills: 5 | Status: SHIPPED | OUTPATIENT
Start: 2025-06-27

## 2025-06-27 RX ORDER — KETOCONAZOLE 20 MG/ML
1 SHAMPOO, SUSPENSION TOPICAL 2 TIMES WEEKLY
Qty: 360 ML | Refills: 6 | Status: SHIPPED | OUTPATIENT
Start: 2025-06-30

## 2025-06-27 NOTE — PATIENT INSTRUCTIONS
TINEA VERSICOLOR    Physical Exam:  Anatomic Location Affected:  chest, shoulders, upper back   Assessment and Plan:  Based on a thorough discussion of this condition and the management approach to it (including a comprehensive discussion of the known risks, side effects and potential benefits of treatment), the patient (family) agrees to implement the following specific plan:  Can start applying ketoconazole shampoo 2x a week to body, lather in for about 5 minutes before rinsing off. Can also use ketoconazole cream twice a day as needed to affected areas  Should you have any questions and/or concerns or should condition not improve within a couple months please reach out to office at [phone]     What is tinea versicolor?  Tinea versicolor or pityriasis versicolor is a type of fungal infection on the skin due to a yeast that lives on all of us. It Is due an overgrowth of a type of yeast called Malassezia furfur, which feeds on oils in the skin and thrives in warm, humid environments. Anyone can develop tinea versicolor, but it is more common during the summer months and in tropical climates. Those who tend to sweat more heavily are also at higher risk. Although it is not considered infectious, multiple family members can be affected.   Teens and young adults are most susceptible due to having oily skin   Affects people of all skin colors   Weakened immune system predisposes to development     What are the clinical symptoms of tinea versicolor?   The first sign of tinea versicolor is often spots on the skin. They can be lighter or darker than surrounding skin, with colors ranging from white, pink, tan, to brown.   The spots are dry, scaly, and sometimes itchy   Can appear anywhere on the body, but more commonly over the neck, trunk, and arms   Spots can grow together forming larger patches   May disappear when temperature drops and return once it becomes warm again  Pale spots can be confused with  vitiligo    How do we diagnose tinea versicolor?  Tinea versicolor is usually diagnosed with a history and physical examination. However, the following tests may be useful for confirmation when in doubt.  Wood lamp (black light) examination-- yellow-green glow may be observed in affected areas  Microscopy using potassium hydroxide (KOH) to examine skin scrapings  Fungal culture--this is usually reported to be negative, as it is quite difficult to persuade the yeasts to grow in a laboratory  Skin biopsy--fungal elements may be seen within the outer cells of the skin (stratum corneum) on histopathology    How do we treat tinea versicolor?   There are many different options to treat tinea versicolor. The treatment chosen may depend on how thick the spots have grown and how much of the body has been affected. Mild tinea versicolor can be treated with primarily topical antifungal agents. These include:  Ketoconazole cream/shampoo  Selenium sulfide   Terbinafine gel   Ciclopirox cream/solution   Propylene glycol solution   Sodium thiosulphate solution   Topical medications should be applied widely to affected areas before bedtime for between three days to two weeks depending on your dermatologists recommendation.   Use of medicated cleansers once or twice a month may prevent recurrence in those who have has multiple bouts of yeast overgrowth     For extensive skin involvement or after failure of topical medications, oral antifungal agents such as itraconazole and fluconazole can be used. Oral terbinafine used to treat dermatophyte infections is not effective against tinea versicolor.   Vigorous exercise an hour after taking the medication may help sweat it onto the skin surface and enhance clearance of the yeast     MELANOCYTIC NEVI  -Relevant exam: Scattered over the trunk/extremities are homogenously pigmented brown macules and papules. ELM performed and without concerning findings. No outliers unless otherwise noted in  today's note  - Exam and clinical history consistent with melanocytic nevi  - Counseled to return to clinic prior to scheduled appointment should any of these lesions change or should any new lesions of concern arise  - Counseled on use of sun protection daily. Reviewed latest FDA sunscreen guidelines, including use of broad spectrum (UVA and UVB blocking) sunscreen or sun protective clothing with SPF 30-50 every 2-3 hours and reapplied after exposure to water    LENTIGINES  OTHER SKIN CHANGES DUE TO CHRONIC EXPOSURE TO NONIONIZING RADIATION  - Relevant exam: Over sun exposed areas are brown macules. ELM performed and without concerning findings.  - Exam and clinical history consistent with lentigines.  - Counseled to return to clinic prior to scheduled appointment should any of these lesions change or should any new lesions of concern arise.  - Recommended use of sunscreen as above and below.    CHERRY ANGIOMAS  - Relevant exam: Scattered over the trunk/extremities are red papules  - Exam and clinical history consistent with cherry angiomas  - Educated that these are benign        ACTINIC KERATOSES  - Relevant exam: On the mid forehead are scaly pink macules without palpable dermal component    - Exam and clinical history consistent with actinic keratoses  - Discussed that these lesions are considered premalignant with the potential to evolve into squamous cell carcinoma.   - Discussed treatment options, which may inclue liquid nitrogen destruction, topical immunotherapy including risks, benefits  - Patient counseled to return to the office in 4-6 weeks after completion of treatment for recheck if not resolved at which time retreatment or biopsy to rule out SCC will be determined based on clinic findings      PROCEDURE:  DESTRUCTION OF PRE-MALIGNANT LESIONS    - After a thorough discussion of treatment options and risk/benefits/alternatives (including but not limited to local pain, scarring, dyspigmentation,  blistering, and possible superinfection), verbal and written consent were obtained and the aforementioned lesions were treated on with cryotherapy using liquid nitrogen x 1 cycle for 5-10 seconds.    TOTAL NUMBER of 1 pre-malignant lesions were treated today on the ANATOMIC LOCATION: mid forehead.     The patient tolerated the procedure well, and after-care instructions were provided.

## 2025-06-27 NOTE — PROGRESS NOTES
"[facility] Dermatology Clinic Note     Patient Name: Agustin Eastman  Encounter Date: 6/27/25       Have you been cared for by a [facility] Dermatologist in the last 3 years and, if so, which description applies to you? NO. I am considered a \"new\" patient and must complete all patient intake questions. I am not of child-bearing potential.     REVIEW OF SYSTEMS:  Have you recently had or currently have any of the following? Recent fever or chills? No  Any non-healing wound? No   PAST MEDICAL HISTORY:  Have you personally ever had or currently have any of the following?  If \"YES,\" then please provide more detail. Skin cancer (such as Melanoma, Basal Cell Carcinoma, Squamous Cell Carcinoma?  No  Tuberculosis, HIV/AIDS, Hepatitis B or C: No  Radiation Treatment No   HISTORY OF IMMUNOSUPPRESSION:   Do you have a history of any of the following:  Systemic Immunosuppression such as Diabetes, Biologic or Immunotherapy, Chemotherapy, Organ Transplantation, Bone Marrow Transplantation or Prednisone?  No     Answering \"YES\" requires the addition of the dotphrase \"IMMUNOSUPPRESSED\" as the first diagnosis of the patient's visit.   FAMILY HISTORY:  Any \"first degree relatives\" (parent, brother, sister, or child) with the following?    Skin Cancer, Pancreatic or Other Cancer? No   PATIENT EXPERIENCE:    Do you want the Dermatologist to perform a COMPLETE skin exam today including a clinical examination under the \"bra and underwear\" areas?  Yes  If necessary, do we have your permission to call and leave a detailed message on your Preferred Phone number that includes your specific medical information?  Yes      Allergies[1] Current Medications[2]        Whom besides the patient is providing clinical information about today's encounter?   NO ADDITIONAL HISTORIAN (patient alone provided history)    Physical Exam and Assessment/Plan by Diagnosis: Patient presents in consultation.  Denies any personal or family history of skin " cancer.    TINEA VERSICOLOR    Physical Exam:  Anatomic Location Affected:  chest, shoulders, upper back   Morphological Description:  hyperpigmented patches  Pertinent Positives:  Pertinent Negatives:    Additional History of Present Condition:  patient reports discoloration of skin.  Worse in the summer and sweating.  Notes occasional, mild itch.    Assessment and Plan:  Based on a thorough discussion of this condition and the management approach to it (including a comprehensive discussion of the known risks, side effects and potential benefits of treatment), the patient (family) agrees to implement the following specific plan:  Start Ketoconazole 2% shampoo daily for 2 weeks, then 3x/week as maintenance.  Lather and let sit for 5 minutes before rinsing off  Start Ketoconazole 2% cream daily  Call if no improvement     MELANOCYTIC NEVI  -Relevant exam: Scattered over the trunk/extremities are homogenously pigmented brown macules and papules. ELM performed and without concerning findings. No outliers unless otherwise noted in today's note  - Exam and clinical history consistent with melanocytic nevi  - Counseled to return to clinic prior to scheduled appointment should any of these lesions change or should any new lesions of concern arise  - Counseled on use of sun protection daily. Reviewed latest FDA sunscreen guidelines, including use of broad spectrum (UVA and UVB blocking) sunscreen or sun protective clothing with SPF 30-50 every 2-3 hours and reapplied after exposure to water    LENTIGINES  OTHER SKIN CHANGES DUE TO CHRONIC EXPOSURE TO NONIONIZING RADIATION  - Relevant exam: Over sun exposed areas are brown macules. ELM performed and without concerning findings.  - Exam and clinical history consistent with lentigines.  - Counseled to return to clinic prior to scheduled appointment should any of these lesions change or should any new lesions of concern arise.  - Recommended use of sunscreen as above and  below.    HAMILTON ANGIOMAS  - Relevant exam: Scattered over the trunk/extremities are red papules  - Exam and clinical history consistent with cherry angiomas  - Educated that these are benign      ACTINIC KERATOSES  - Relevant exam: On the mid forehead are scaly pink macules without palpable dermal component    History of Present Illness: Reports patch on forehead which scales and flakes.  Asymptomatic, no bleeding or pain.    - Exam and clinical history consistent with actinic keratoses  - Discussed that these lesions are considered premalignant with the potential to evolve into squamous cell carcinoma.   - Discussed treatment options, which may inclue liquid nitrogen destruction, topical immunotherapy including risks, benefits  - Patient counseled to return to the office in 4-6 weeks after completion of treatment for recheck if not resolved at which time retreatment or biopsy to rule out SCC will be determined based on clinic findings      PROCEDURE:  DESTRUCTION OF PRE-MALIGNANT LESIONS    - After a thorough discussion of treatment options and risk/benefits/alternatives (including but not limited to local pain, scarring, dyspigmentation, blistering, and possible superinfection), verbal and written consent were obtained and the aforementioned lesions were treated on with cryotherapy using liquid nitrogen x 1 cycle for 5-10 seconds.    TOTAL NUMBER of 1 pre-malignant lesions were treated today on the ANATOMIC LOCATION: mid forehead.     The patient tolerated the procedure well, and after-care instructions were provided.     Scribe Attestation      I,:  Celena Akers MA am acting as a scribe while in the presence of the attending physician.:       I,:  CHRISTOPHER Meier personally performed the services described in this documentation    as scribed in my presence.:                [1]   Allergies  Allergen Reactions    Lunesta [Eszopiclone] Delirium   [2]   Current Outpatient Medications:     lisinopril  (ZESTRIL) 10 mg tablet, Take 10 mg by mouth daily (Patient not taking: Reported on 2/29/2024), Disp: , Rfl:     methocarbamol (ROBAXIN) 500 mg tablet, Take 1 tablet (500 mg total) by mouth 3 (three) times a day for 5 days, Disp: 15 tablet, Rfl: 0